# Patient Record
Sex: FEMALE | Race: WHITE | Employment: FULL TIME | URBAN - METROPOLITAN AREA
[De-identification: names, ages, dates, MRNs, and addresses within clinical notes are randomized per-mention and may not be internally consistent; named-entity substitution may affect disease eponyms.]

---

## 2018-01-23 ENCOUNTER — HOSPITAL ENCOUNTER (OUTPATIENT)
Dept: PHYSICAL THERAPY | Age: 38
Discharge: HOME | End: 2018-01-23
Payer: COMMERCIAL

## 2018-01-23 PROCEDURE — 97162 PT EVAL MOD COMPLEX 30 MIN: CPT

## 2018-01-23 PROCEDURE — 97110 THERAPEUTIC EXERCISES: CPT

## 2018-01-23 NOTE — PROGRESS NOTES
PHYSICAL THERAPY - DAILY TREATMENT NOTE     Patient Name: Jenny Veronica        Date: 2018  : 1980   YES Patient  Verified  Visit #:   1   of   8  Insurance: Payor: Dina Francis / Plan: [employer] HMO / Product Type: HMO /      In time: 3:00 Out time: 4:00   Total Treatment Time:  60 min     Medicare Time Tracking (below)   Total Timed Codes (min):  n/a 1:1 Treatment Time:  n/a     TREATMENT AREA =  Cervical radiculopathy, R shoulder pain    SUBJECTIVE    Pain Level (on 0 to 10 scale):  3  / 10   Medication Changes/New allergies or changes in medical history, any new surgeries or procedures? NO    If yes, update Summary List   Subjective Functional Status/Changes:  []  No changes reported     Patient's main complaint: R shoulder pain    When did symptoms begin and how: Patient reports onset of R shoulder pain approximately 18 months ago that radiated down the arm. Patient states she was diagnosed with cervical radiculopathy and provided oral steroids with abolishment of pain for 1 year. Pt reports current symptoms are \"similar\" in nature and began in 2017 while playing tennis. Patient reports pain has spread to the upper back and to the shoulder. Patient reports symptoms are on dorsum of the hand and into fingers 3-5 primarily. 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    Goals: Tennis        OBJECTIVE    Physical Therapy Evaluation - Shoulder    Physical Therapy Evaluation Cervical Spine - [facility]    SUBJECTIVE  Chief Complaint: RUE pain    Mechanism of injury: Insidious    Symptoms  Pain rating (0-10):    Today: 3/10   Best: 0/10   Worst: 3-4/10   [] Contstant:    [x] Intermittent:     Aggravated by: see above   [] Bending [] Sitting [] Standing [] Reaching Overhead   [] Moving [] Cough [] Sneeze [] Eating   [] AM  [] PM  Lying:  [] sup   [] pro   [] sidelying   [] Other:     Eased by: rest   [] Bending [] Sitting [] Standing Lying: [] sup  [] pro  [] sidelying   [] Moving [] AM  [] PM  [] Other:     General Health:  Red Flags Indicated? [] Yes    [x] No  [] Yes [x] No Recent weight change (If yes, due to dieting? [] Yes  [] No)   [] Yes [x] No Persistent cough  [] Yes [x] No Unremitting pain at night  [] Yes [x] No Dizziness  [] Yes [x] No Blurred vision  [] Yes [x] No Hands more cold or painful in cold weather  [] Yes [x] No Ringing in ears  [] Yes [x] No Difficulty swallowing  [] Yes [x] No Dysfunction of bowel or bladder  [] Yes [x] No Recent illness within past 3 weeks (i.e, cold, flu)  [] Yes [x] No Jaw pain    Past History/Treatments:    Diagnostic Tests: [] Lab work [] X-rays    [] CT [] MRI     [] Other:  Results: None    Headaches: Do you have headaches?  [] Yes   [x] No:    OBJECTIVE  Posture: [] WNL  Head Position: slightly forward  Shoulder/Scapular Position: slightly protracted    Cervical Retraction: [] WNL    [x] Abnormal:    Shoulder/Scapular Screen: [] WNL    [x] Abnormal:    Thoracic Spine: [] N/A    [] WNL   [x] Other:    Palpation:  [] Min  [x] Mod  [] Severe    Location: R upper trap, LS origin, medial scapular border    [] Min  [] Mod  [] Severe    Location:  [] Min  [] Mod  [] Severe    Location:    C/S ROM           Range        Effect            Strength (MMT)        R       L   Flex    62   Upper Trap (C2,3,4)     5   5   Ext    40 Centralized Shld IR (C5,6)     4+ 5     SB Right   28    Shld ER (C5)     4-   5   SB Left   30    Wrist flex (C7)     5   5   R-rot   75   stiffness Wrist ext (C6)     4 5   L-rot    67    Deltiod (C5,6)     4 p! 5            Biceps (C5, C6)     4   5            Triceps (C7)     4   5            Thenar Ext (C8)     5   5            Intrinsics (T1)     5   5         Low Trapezius                      R  strength: 53# average  L  strength: 64# average    Special Tests:  Cervical:        Vertebral Artery:  [] R    [] L    [] +    [x] -       Alar Ligament: [] R    [] L    [] +    [x] -       Transverse Lig: [] R    [] L    [] +    [x] -       Spurling's:  [x] R    [] L    [x] +    [] -    Visible Defects? [] Yes  [] No (ecchymosis, edema, inflammation, deformities)     ROM:  [] Unable to assess at this time     Painful Arc: [] Yes  [x] No                                            AROM                                                               Left Right   Flexion 160 147   Extension 70 60   Scaption/ 175   ER @ 0 Degrees 85 85   ER @ 90 Degrees 90 90   IR @ 90 Degrees     IR HBB T3 T10   Functional ER WNl WNL       Accessory Motion Testing  Laxity Present? [x] Yes   [] No   Direction of laxity: Posteriorly    Scapulohumoral Control / Rhythm:  Able to eccentrically lower with good control? Left: [x] Yes   [] No     Right: [x] Yes   [] No     Special Tests:     Impingement:   Neer's Test  [] Pos   [x] Neg   Hawkin's Test  [] Pos   [x] Neg  Cross body adduction [] Pos   [x] Neg    Biceps:   Speed's Test  [] Pos   [x] Neg  Dynamic Speeds Test  [] Pos   [x] Neg   Yergason's Test [] Pos   [x] Neg    RTC:    Empty Can  [] Pos   [x] Neg  Shrug Sign  [] Pos   [x] Neg  Drop Arm Test  [] Pos   [x] Neg    AC Joint:  AC Joint  [] Pos   [x] Neg  Shear Test  [] Pos   [x] Neg     Others:   SC Joint  [] Pos   [x] Neg  Pectoral Tightness [] Pos   [x] Neg  Anterior Apprehension [] Pos   [x] Neg   Posterior Apprehension [] Pos   [x] Neg    10 min Therapeutic Exercise:  [x]  See flow sheet   Rationale: To centralized C/S symptoms in order to reduce RUE dysfunction    throughout min Patient Education:  YES  Reviewed HEP   []  Progressed/Changed HEP based on:         Other Objective/Functional Measures:    See objective measures above     Post Treatment Pain Level (on 0 to 10) scale:   1  / 10 C/S     ASSESSMENT    Assessment/Changes in Function: Patient History: Moderate (hx cervical radiculopathy)  Examination (low 1-2, mod 3+, high 4+): High per objective above  Clinical Presentation (low stable or uncomplicated, mod evolving or changing, high unstable or unpredictable): Moderate  Clinical Decision Making (low , mod 26-74, high 1-25): FOTO Moderate     []  See Progress Note/Recertification   Patient will continue to benefit from skilled PT services to modify and progress therapeutic interventions, address functional mobility deficits, address ROM deficits, address strength deficits, analyze and address soft tissue restrictions, analyze and cue movement patterns, analyze and modify body mechanics/ergonomics, assess and modify postural abnormalities and instruct in home and community integration to attain remaining goals. to attain remaining goals.    Progress toward goals / Updated goals:    See POC     PLAN    [x]  Upgrade activities as tolerated YES Continue plan of care   []  Discharge due to :    []  Other:      Therapist: Mauro Ferreira DPT    Date: 1/23/2018 Time: 11:03 AM

## 2018-01-24 NOTE — PROGRESS NOTES
Richard [address] [facility] PHYSICAL THERAPY  [address] [facility] [address], [address] - Phone: [phone]  Fax: [phone] / [address] [facility]  Patient Name: Franchesca Laura : 1980   Medical   Diagnosis: Pain in right shoulder [M25.511] Treatment Diagnosis: C7-C8 Cervical Radiculopathy   Onset Date: 2017 AGE: 44 y.o. Referral Source: Referred, Self, MD Start of [facility]): 2018   Prior Hospitalization: See medical history Provider #: [id number]   Prior Level of Function: Recreationally active; avid    Comorbidities: Hx cervical radiculopathy '16   Medications: Verified on Patient Summary List   The Plan of Care and following information is based on the information from the initial evaluation.   =======================================================================================  Assessment / key information:  Patient is a 44 y.o. female who presents via [facility] with chief complaint of R arm pain, R shoulder pain, neck pain and R periscapular pain/stiffness. Evaluation revealed s/s consistent with C7-C8 posterior cervical derangement affecting RUE. Patient entered evaluation with c/o RUE pain/tingling/numbness into RUE down to dorsum of R hand. Pt able to centralize symptoms to R shoulder with repeated C/S extensions; pt further centralized to level of C/S with manual cervical retractions with end range extension. Pt demonstrates decreased cervical AROM, dec R shoulder strength/AROM (PROM WNL), + Spurlings, tenderness to palpation medial scapular border/cervical paraspinals/sub-occipitals and upper trap on the right and decreased  strength. Patient notes she has difficulty with work tasks, an inability to play tennis due to pain and overall impaired ability to perform ADLs and self care duties without pain.  Patient would benefit from skilled PT services to address these issues and improve the above mentioned impairments. Objective Data:    C/S ROM     Range    Effect            Strength (MMT)          R         L   Flex    62   Upper Trap (C2,3,4)     5   5   Ext    40 Centralized Shld IR (C5,6)     4+   5     SB Right   28    Shld ER (C5)     4-   5   SB Left   30    Wrist flex (C7)     5   5   R-rot   75   stiffness Wrist ext (C6)     4   5   L-rot    67    Deltiod (C5,6)     4 p!   5            Biceps (C5, C6)     4   5            Triceps (C7)     4   5            Thenar Ext (C8)     5   5            Intrinsics (T1)     5   5           Low Trapezius                         R  strength: 53# average  L  strength: 64# average    Shoulder ROM:  [] Unable to assess at this time      Painful Arc: [] Yes  [x] No                                            AROM                                                                Left Right   Flexion 160 147 p! Extension 70 60   Scaption/ 175   ER @ 0 Degrees 85 85   ER @ 90 Degrees 90 90   IR @ 90 Degrees       IR HBB T3 T10 p!    Functional ER WNl WNL      ======================================================================================  Eval Complexity: History: MEDIUM  Complexity : 1-2 comorbidities / personal factors will impact the outcome/ POC Exam:HIGH Complexity : 4+ Standardized tests and measures addressing body structure, function, activity limitation and / or participation in recreation  Presentation: MEDIUM Complexity : Evolving with changing characteristics  Clinical Decision Making:MEDIUM Complexity : FOTO score of 26-74Overall Complexity:MEDIUM  Problem List: pain affecting function, decrease ROM, decrease strength, decrease ADL/ functional abilitiies, decrease activity tolerance and decrease flexibility/ joint mobility   Treatment Plan may include any combination of the following: Therapeutic exercise, Therapeutic activities, Neuromuscular re-education, Physical agent/modality, Manual therapy, Patient education, Self Care training, Functional mobility training and Home safety training  Patient / Family readiness to learn indicated by: asking questions, trying to perform skills and interest  Persons(s) to be included in education: patient (P)  Barriers to Learning/Limitations: None  Measures taken:    Patient Goal (s): \"to be able to play tennis again. \"   Patient self reported health status: good  Rehabilitation Potential: good   Short Term Goals: To be accomplished in  2  Weeks:  1. Patient will be compliant with HEP in order to facilitate progression of therapeutic exercise and improve mobility  2. Patient will demonstrate ability to independently centralize RUE symptoms to level of C/S to dec RUE dysfunction     Long Term Goals: To be accomplished in  4  Weeks:  1. Patient will be independent with HEP in order to demonstrate ability to perform therapeutic exercises and continue progressing functional mobility upon D/C  2. Patient will demonstrate symmetrical bilateral  strength to improve ease with gripping/carrying  3. Patient will improve FOTO score by >/= 15%to demonstrate a meaningful improvement in functional mobility and increased quality of life  4. Patient will report 75% improvement in ability to write on whiteboard at work in order to improve ease with work tasks. 5. Patient will demonstrate R shoulder flexion/abduction >/= 165 degrees without pain/difficulty to improve ease with overhead activities      Frequency / Duration:   Patient to be seen  1-2  times per week for 4  weeks:  Patient / Caregiver education and instruction: self care, activity modification and exercises  G-Codes (GP): n/a  Therapist Signature: Fredo Fung DPT Date: 7/77/5918   Certification Period: n/a Time: 10:42 AM   ===========================================================================================  I certify that the above Physical Therapy Services are being furnished while the patient is under my care.   I agree with the treatment plan and certify that this therapy is necessary. Physician Signature:        Date:       Time:     Please sign and return to [employer] or you may fax the signed copy to [phone]. Thank you.

## 2018-02-02 ENCOUNTER — HOSPITAL ENCOUNTER (OUTPATIENT)
Dept: PHYSICAL THERAPY | Age: 38
Discharge: HOME | End: 2018-02-02
Payer: COMMERCIAL

## 2018-02-02 PROCEDURE — 97530 THERAPEUTIC ACTIVITIES: CPT

## 2018-02-02 PROCEDURE — 97110 THERAPEUTIC EXERCISES: CPT

## 2018-02-02 NOTE — PROGRESS NOTES
PHYSICAL THERAPY - DAILY TREATMENT NOTE    Patient Name: Arun Show        Date: 2018  : 1980   yes Patient  Verified  Visit #:   2   of   8  Insurance: Payor: May Merrill / Plan: [employer] HMO / Product Type: HMO /      In time: 980 Out time: 818   Total Treatment Time: 43     TREATMENT AREA =  Pain in right shoulder [M25.511]    SUBJECTIVE  Pain Level (on 0 to 10 scale):  3  / 10   Medication Changes/New allergies or changes in medical history, any new surgeries or procedures?    no  If yes, update Summary List   Subjective Functional Status/Changes:  []  No changes reported     \"I feel it down the R arm, the exercises help. I do need to sit up better. I will work on it\"          OBJECTIVE      35 min Therapeutic Exercise:  [x]  See flow sheet   Rationale:      increase ROM and increase strength to improve the patients ability to perform c/s AROM for ADLs/driving, to perform prolong sitting with ease        8 min Therapeutic Activity: postural/bed mobility training     Rationale:    increase ROM and increase strength to improve the patients ability to perform c/s AROM for ADLs/driving, to perform prolong sitting with ease        min Patient Education:  yes  Reviewed HEP   []  Progressed/Changed HEP based on:  Pt ed on importance and benefits of compliance with HEP, core strength/stability and proper posture; pt verbalized understanding       Other Objective/Functional Measures:  c/s ret- P/B> with ext- P/B =centralized to R ; instructed to perform every hr x 10  t/s ext over chair-P/B centralized to R     R T/s Rot < L T/s Rot with p!; decrease p! with rep R t/s Rot    VCs + demo to perform proper technique for TE    Initiated TE per flowsheet without c/o p!    Reviewed proper bed mobility, sleeping positions,  and importance and benefits of a neutral spine    c/o \"clicking\" without p! in R SH with tband ext   Post Treatment Pain Level (on 0 to 10) scale:   2  / 10 to Sh ASSESSMENT  Assessment/Changes in Function:     Progressed there-ex without c/o increase p!  demos poor posture; improved with VCs  demo proper bed mobility with VCs     []  See Progress Note/Recertification   Patient will continue to benefit from skilled PT services to modify and progress therapeutic interventions, address functional mobility deficits, address ROM deficits, address strength deficits, analyze and address soft tissue restrictions, analyze and cue movement patterns, analyze and modify body mechanics/ergonomics, assess and modify postural abnormalities and instruct in home and community integration to attain remaining goals.    Progress toward goals / Updated goals:    Pt's first visit since IE, no noted progress        PLAN  []  Upgrade activities as tolerated yes Continue plan of care   []  Discharge due to :    []  Other:      Therapist: Kat Greene PTA    Date: 2/2/2018 Time: 8:12 AM     Future Appointments  Date Time Provider Nimisha Saba   2/5/2018 7:30 AM [facility]   2/9/2018 7:30 AM [facility]   2/12/2018 7:30 AM [facility]   2/16/2018 7:30 AM Laureano Gold, PT [facility]   2/19/2018 7:30 AM Laureano Gold, PT [facility]   2/23/2018 7:30 AM [facility]   2/26/2018 7:30 AM [facility]

## 2018-02-05 ENCOUNTER — HOSPITAL ENCOUNTER (OUTPATIENT)
Dept: PHYSICAL THERAPY | Age: 38
Discharge: HOME | End: 2018-02-05
Payer: COMMERCIAL

## 2018-02-05 PROCEDURE — 97110 THERAPEUTIC EXERCISES: CPT

## 2018-02-05 PROCEDURE — 97140 MANUAL THERAPY 1/> REGIONS: CPT

## 2018-02-05 NOTE — PROGRESS NOTES
PHYSICAL THERAPY - DAILY TREATMENT NOTE      Patient Name: Jenny Veronica        Date: 2018  : 1980   YES Patient  Verified  Visit #:   3      8  Insurance: Payor: Dina Penny / Plan: [employer] HMO / Product Type: HMO /      In time: 7:33 Out time: 8:18   Total Treatment Time: 45       TREATMENT AREA =  Pain in right shoulder [M25.511]    SUBJECTIVE    Pain Level (on 0 to 10 scale):  3  / 10   Medication Changes/New allergies or changes in medical history, any new surgeries or procedures? NO    If yes, update Summary List   Subjective Functional Status/Changes:  []  No changes reported     \"Its down the arm a little bit. \" Patient reports the RUE pain is overall better. Pt reports she tried to swim on Saturday and the pain was pretty bad in the right arm       OBJECTIVE    37 min Therapeutic Exercise:  [x]  See flow sheet   Rationale:      increase ROM, increase strength, improve coordination, improve balance and increase proprioception to improve the patients ability to increase pt's stability/mobility and improve functional activity and ability to perform ADL's    8 min Manual Therapy: Manual C/S retractions with ext to end range   Rationale:      decrease pain, increase ROM, increase tissue extensibility and increase postural awareness to improve patient's ability to perform functional activities and decrease pain. 1 min Patient Education:  YES  Reviewed HEP   []  Progressed/Changed HEP based on: Other Objective/Functional Measures:    Pt entered session with complaint of RUE pain to lateral R shoulder.   C to C/S with repeated C/S extensions in seated position  Further C and reduced middle finger pain with manual C/S retr w/ ext to end range     Sig TTP R levator; introduced ischemic compression with tennis ball and theracane      Post Treatment Pain Level (on 0 to 10) scale:   1  / 10     ASSESSMENT    Assessment/Changes in Function:     Patient with overall improvement in radicular symptoms. Reports unable to tolerate \"light\" swimming d/t inc in R radicular symptoms. []  See Progress Note/Recertification   Patient will continue to benefit from skilled PT services to modify and progress therapeutic interventions, address functional mobility deficits, address ROM deficits, address strength deficits, analyze and address soft tissue restrictions, analyze and cue movement patterns, analyze and modify body mechanics/ergonomics, assess and modify postural abnormalities and instruct in home and community integration to attain remaining goals. Progress toward goals / Updated goals: · Short Term Goals: To be accomplished in  2  Weeks:  1. Patient will be compliant with HEP in order to facilitate progression of therapeutic exercise and improve mobility  2. Patient will demonstrate ability to independently centralize RUE symptoms to level of C/S to dec RUE dysfunction. Progressing       PLAN    [x]  Upgrade activities as tolerated YES Continue plan of care   []  Discharge due to :    []  Other:      Therapist: Vini Mitchell DPT, Cert.  DN    Date: 2/5/2018 Time: 7:24 AM     Future Appointments  Date Time Provider Nimisha Saba   [facility]   2/9/2018 7:30 AM [facility]   2/12/2018 7:30 AM Wilberto [facility]   2/16/2018 7:30 AM [facility]   2/19/2018 7:30 AM Tong [facility]oples [facility]   2/23/2018 7:30 AM [facility]   2/26/2018 7:30 AM Wilberto [facility]

## 2018-02-09 ENCOUNTER — HOSPITAL ENCOUNTER (OUTPATIENT)
Dept: PHYSICAL THERAPY | Age: 38
Discharge: HOME | End: 2018-02-09
Payer: COMMERCIAL

## 2018-02-09 PROCEDURE — 97110 THERAPEUTIC EXERCISES: CPT

## 2018-02-09 PROCEDURE — 97140 MANUAL THERAPY 1/> REGIONS: CPT

## 2018-02-09 NOTE — PROGRESS NOTES
PHYSICAL THERAPY - DAILY TREATMENT NOTE  -    Patient Name: Jacki Martinez        Date: 2018  : 1980   YES Patient  Verified  Visit #:   4   of   8  Insurance: Payor: [employer] Drivers / Plan: [employer] HMO / Product Type: HMO /      In time: 7:30 Out time: 8:20   Total Treatment Time: 50 min       TREATMENT AREA =  Pain in right shoulder [M25.511]    SUBJECTIVE    Pain Level (on 0 to 10 scale):  2-3  / 10   Medication Changes/New allergies or changes in medical history, any new surgeries or procedures? NO    If yes, update Summary List   Subjective Functional Status/Changes:  []  No changes reported     \"Its definitely better. It still comes down the arm every now and then. \"        OBJECTIVE    25 min Therapeutic Exercise:  [x]  See flow sheet   Rationale:      increase ROM, increase strength, improve coordination, improve balance and increase proprioception to improve the patients ability to increase pt's stability/mobility and improve functional activity and ability to perform ADL's    25 min Manual Therapy: Manual cervical retractions with ext to end range, STM to R ant/mid scalenes, STM to medial periscapular muscles, UT/LS   Rationale:      decrease pain, increase ROM, increase tissue extensibility, decrease trigger points and increase postural awareness to improve patient's ability to perform functional activities and decrease pain. 1 min Patient Education:  YES  Reviewed HEP   []  Progressed/Changed HEP based on:   Progressed scap stab #1 and #2 to GTB     Other Objective/Functional Measures:    Entered session with c/o slight dull achiness in RUE to dorsal 3rd and 4th digits. NE with repeated C/S retractions in sitting  Mild decr achiness with C/S retr w/ extension in sitting    Pt with significant tenderness along anterior/mid scalenes as the attach to first rib. Pt with reports of symptoms into pec major and anterior shoulder with STM to this area.     Added LT wall slides     Post Treatment Pain Level (on 0 to 10) scale:   0  / 10     ASSESSMENT    Assessment/Changes in Function:     Decreasing frequency/intensity of RUE pain symptoms. Continue to address scalene tightness and periscapular strengthening. []  See Progress Note/Recertification   Patient will continue to benefit from skilled PT services to modify and progress therapeutic interventions, address functional mobility deficits, address ROM deficits, address strength deficits, analyze and address soft tissue restrictions, analyze and cue movement patterns, analyze and modify body mechanics/ergonomics, assess and modify postural abnormalities, address imbalance/dizziness and instruct in home and community integration to attain remaining goals. Progress toward goals / Updated goals:    Progressing well towards goals. Added scalene STM and LT wall slides. PLAN    [x]  Upgrade activities as tolerated YES Continue plan of care   []  Discharge due to :    []  Other:      Therapist: Christen Alpers, DPT, Cert.  DN    Date: 2/9/2018 Time: 7:38 AM     Future Appointments  Date Time Provider Nimisha Saba   2/12/2018 7:30 AM [facility]le[facility]a [facility]   2/16/2018 7:30 AM Glorious Ace, PT [facility]   2/19/2018 7:30 AM Glorious Ace, PT [facility]   2/23/2018 7:30 AM Janinaa [facility]   2/26/2018 7:30 AM Delemanuela [facility]

## 2018-02-12 ENCOUNTER — HOSPITAL ENCOUNTER (OUTPATIENT)
Dept: PHYSICAL THERAPY | Age: 38
Discharge: HOME | End: 2018-02-12
Payer: COMMERCIAL

## 2018-02-12 PROCEDURE — 97140 MANUAL THERAPY 1/> REGIONS: CPT

## 2018-02-12 PROCEDURE — 97110 THERAPEUTIC EXERCISES: CPT

## 2018-02-12 NOTE — PROGRESS NOTES
PHYSICAL THERAPY - DAILY TREATMENT NOTE  8-    Patient Name: Arianna Galvez        Date: 2018  : 1980   YES Patient  Verified  Visit #:      of   8  Insurance: Payor: Toshia Corea / Plan: [employer] HMO / Product Type: HMO /      In time: 7:30 Out time: 8:15   Total Treatment Time: 45 min       TREATMENT AREA =  Pain in right shoulder [M25.511]    SUBJECTIVE    Pain Level (on 0 to 10 scale):  3   10   Medication Changes/New allergies or changes in medical history, any new surgeries or procedures? NO    If yes, update Summary List   Subjective Functional Status/Changes:  []  No changes reported     \"I didn't have the pain down my arm over the weekend. Its worse in the neck though. \"        OBJECTIVE    30 min Therapeutic Exercise:  [x]  See flow sheet   Rationale:      increase ROM, increase strength, improve coordination, improve balance and increase proprioception to improve the patients ability to increase pt's stability/mobility and improve functional activity and ability to perform ADL's    15 min Manual Therapy: STM to R ant/mid scalenes (combined with active shoulder flexion),    Rationale:      decrease pain, increase ROM, increase tissue extensibility, decrease trigger points and increase postural awareness to improve patient's ability to perform functional activities and decrease pain. 1 min Patient Education:  YES  Reviewed HEP   []  Progressed/Changed HEP based on: Other Objective/Functional Measures: Added UT/LS and rhomboid stretches seated  Added open book and scap stab #4    Continued significant TTP ant/mid scalenes. Post Treatment Pain Level (on 0 to 10) scale:   2   10     ASSESSMENT    Assessment/Changes in Function:     No symptoms distal to R shoulder x 3-4 days. Inc tightness/pain in periscapular and cervical musculature.      []  See Progress Note/Recertification   Patient will continue to benefit from skilled PT services to modify and progress therapeutic interventions, address functional mobility deficits, address ROM deficits, address strength deficits, analyze and address soft tissue restrictions, analyze and cue movement patterns, analyze and modify body mechanics/ergonomics, assess and modify postural abnormalities, address imbalance/dizziness and instruct in home and community integration to attain remaining goals. Progress toward goals / Updated goals: · Short Term Goals: To be accomplished in  2  Weeks:  1. Patient will be compliant with HEP in order to facilitate progression of therapeutic exercise and improve mobility  2. Patient will demonstrate ability to independently centralize RUE symptoms to level of C/S to dec RUE dysfunction        PLAN    [x]  Upgrade activities as tolerated YES Continue plan of care   []  Discharge due to :    []  Other:      Therapist: Wandy Rico, TIFFANIE, Cert.  DN    Date: 2/12/2018 Time: 7:26 AM     Future Appointments  Date Time Provider Nimisha Saba   2/12/2018 7:30 AM [facility]   2/16/2018 7:30 AM Aubree Tay PT [facility]   2/19/2018 7:30 AM Aubree Tay PT [facility]   2/23/2018 7:30 AM [facility]   2/26/2018 7:30 AM [facility]

## 2018-02-16 ENCOUNTER — HOSPITAL ENCOUNTER (OUTPATIENT)
Dept: PHYSICAL THERAPY | Age: 38
Discharge: HOME | End: 2018-02-16
Payer: COMMERCIAL

## 2018-02-16 PROCEDURE — 97110 THERAPEUTIC EXERCISES: CPT

## 2018-02-16 PROCEDURE — 97140 MANUAL THERAPY 1/> REGIONS: CPT

## 2018-02-16 NOTE — PROGRESS NOTES
PHYSICAL THERAPY - DAILY TREATMENT NOTE    Patient Name: Carli Heart        Date: 2018  : 1980   YES Patient  Verified  Visit #:   6   of   8  Insurance: Payor: Venu Carlisle / Plan: [employer] HMO / Product Type: HMO /      In time: 7:30 Out time: 8:20   Total Treatment Time: 50     TREATMENT AREA = Pain in right shoulder [M25.511]    SUBJECTIVE    Pain Level (on 0 to 10 scale):  3  / 10   Medication Changes/New allergies or changes in medical history, any new surgeries or procedures? NO    If yes, update Summary List   Subjective Functional Status/Changes:  []  No changes reported     \"It's mostly in the upper arm and top of the shoulder (UT) \"          OBJECTIVE  Therapeutic Procedures:  Min Procedure Specifics + Rationale   n/a [x]  Patient Education (performed throughout session) [x] Review HEP    [] Progressed/Changed HEP based on:   [] proper performance and advancement of Therex/TA   [] reduction in pain level    [] increased functional capacity       [] change in directional preference   25 [x] Therapeutic Exercise   [x]  See Flowsheet   Rationale: increase ROM and increase strength to improve the patients ability to participate in ADL's    25 []  Manual Therapy [] STM/DTM     [] IASTM     [] Scar Massage  [] X-friction       [] PNF         [] PROM    [] TDN (see objective; actual needle insertion time not billed)   [] Soft tissue mobz   [] Joint mobz: Gr I [] II []  III [] IV[] V[]:  Repeated retraction mobilization with extension and wiggle. Rationale: decrease pain, increase ROM, increase tissue extensibility, decrease trigger points and increase postural awareness to attain functional use and participation with ADL's         Other Objective/Functional Measures:  Repeated retraction with extension produced R middle finger symptoms, which patient reports had never occurred before. Required unloading to centralize, with C/S abolished but C pain to upper T/S.  Able to abolish with upper T/S PA mobs  Patient observed to turn and roll/rotate head when asked to reassess symptoms. She reported flare up of symptoms after doing so, and states that it is a habit of hers to do so throughout the day as it \"feels good sometimes\" but admits also worsens afterwards. When asked about the frequency of retractions, she admits she is not fully compliant, only performing when she remembers. Patient further educated on centralization process and maintaining HEP compliance frequency, as well as avoiding aggravating factors after centralizing. Patient declined manipulation  Performed Repeated Retractions throughout treatment b/w sets/reps/exercises as needed as prophylaxis and symptoms management. Post Treatment Pain Level (on 0 to 10) scale:   0  / 10     ASSESSMENT    Assessment/Changes in Function:     Derangement having difficulty reducing due to intermittent HEP compliance   []  See Progress Note/ Recertification  []  See Discharge Summary        Patient will continue to benefit from skilled PT services to modify and progress therapeutic interventions, address functional mobility deficits, address ROM deficits, address strength deficits, analyze and address soft tissue restrictions, analyze and cue movement patterns, analyze and modify body mechanics/ergonomics and instruct in home and community integration  to attain remaining goals   Progress toward goals / Updated goals:    Slow progression in carryover of relief     PLAN    [x]  Upgrade activities as tolerated  [x]  Update interventions per flow sheet YES Continue plan of care   []  Discharge due to :    []  Other:      Therapist: Dayton Vincent \"BJ\" Sheridan Slaughter, DPT, Cert. MDT, Cert. DN, Cert.  SMT    Date: 2/16/2018 Time: 7:32 AM   Future Appointments  Date Time Provider Nimisha Saba   2/19/2018 7:30 AM Robert Hercules [facility]   2/23/2018 7:30 AM [facility]   2/26/2018 7:30 AM [facility]

## 2018-02-19 ENCOUNTER — HOSPITAL ENCOUNTER (OUTPATIENT)
Dept: PHYSICAL THERAPY | Age: 38
Discharge: HOME | End: 2018-02-19
Payer: COMMERCIAL

## 2018-02-19 PROCEDURE — 97110 THERAPEUTIC EXERCISES: CPT

## 2018-02-19 PROCEDURE — 97140 MANUAL THERAPY 1/> REGIONS: CPT

## 2018-02-19 NOTE — PROGRESS NOTES
PHYSICAL THERAPY - DAILY TREATMENT NOTE    Patient Name: Kiko Price        Date: 2018  : 1980   YES Patient  Verified  Visit #:   7      8  Insurance: Payor: Adama Mulligan / Plan: [employer] HMO / Product Type: HMO /      In time: 7:30 Out time: 8:25   Total Treatment Time: 55     TREATMENT AREA = Pain in right shoulder [M25.511]    SUBJECTIVE    Pain Level (on 0 to 10 scale):  2  / 10   Medication Changes/New allergies or changes in medical history, any new surgeries or procedures? NO    If yes, update Summary List   Subjective Functional Status/Changes:  []  No changes reported     \"It's a lot better, just stiff. It's improving since I've been doing the exercises more. \"          OBJECTIVE  Therapeutic Procedures:  Min Procedure Specifics + Rationale   n/a [x]  Patient Education (performed throughout session) [x] Review HEP    [] Progressed/Changed HEP based on:   [] proper performance and advancement of Therex/TA   [] reduction in pain level    [] increased functional capacity       [] change in directional preference   40 [x] Therapeutic Exercise   [x]  See Flowsheet   Rationale: increase ROM and increase strength to improve the patients ability to participate in ADL's      Modality rationale: decrease inflammation, decrease pain, increase tissue extensibility and increase muscle contraction/control to improve the patients ability to perform ADL's with greater ease   Min Type Additional Details   15 [x]  Traction:   [] Cervical          []Lumbar                          [] Intermitent      []Continuous Step x1 Ramp/hold/ lbs:10/20     Angle:30degrees  [] supine              [] prone  [] legs elevated    [] legs flat   [] Skin assessment post-treatment:  []intact []redness- no adverse reaction       []redness  adverse reaction:     Other Objective/Functional Measures:    Resumed therex per flow sheet while performing repeated movements prn.   Educated pt on trial of mechanical traction and precautions. Despite discussing them and advising her to ring the bell and switch the buzzer, she did not utilize any signal until the end of the treatment even though she was experiencing pain/discomfort and headache. Patient advised to perform repeated retractions afterwards which abolished the headache. Post Treatment Pain Level (on 0 to 10) scale:   1-2  / 10     ASSESSMENT    Assessment/Changes in Function:     Derangement continuing to reduce with repeated movements, improved compliance to her HEP has improved her tolerance towards treatment. []  See Progress Note/ Recertification  []  See Discharge Summary        Patient will continue to benefit from skilled PT services to modify and progress therapeutic interventions, address functional mobility deficits, address ROM deficits, address strength deficits, analyze and address soft tissue restrictions, analyze and cue movement patterns, analyze and modify body mechanics/ergonomics and instruct in home and community integration  to attain remaining goals   Progress toward goals / Updated goals:    Improved HEP compliance     PLAN    [x]  Upgrade activities as tolerated  [x]  Update interventions per flow sheet YES Continue plan of care   []  Discharge due to :    []  Other:      Therapist: Juana Patel \"CRISTIAN\" Tang Jacobs DPT, Cert. MDT, Cert. DN, Cert.  SMT    Date: 2/19/2018 Time: 7:36 AM   Future Appointments  Date Time Provider Nimisha Saba   2/23/2018 7:30 AM [facility]   2/26/2018 7:30 AM [facility]

## 2018-02-23 ENCOUNTER — HOSPITAL ENCOUNTER (OUTPATIENT)
Dept: PHYSICAL THERAPY | Age: 38
Discharge: HOME | End: 2018-02-23
Payer: COMMERCIAL

## 2018-02-23 PROCEDURE — 97140 MANUAL THERAPY 1/> REGIONS: CPT

## 2018-02-23 PROCEDURE — 97110 THERAPEUTIC EXERCISES: CPT

## 2018-02-23 PROCEDURE — 97530 THERAPEUTIC ACTIVITIES: CPT

## 2018-02-23 NOTE — PROGRESS NOTES
PHYSICAL THERAPY - DAILY TREATMENT NOTE      Patient Name: Alexandru Zhu        Date: 2018  : 1980   YES Patient  Verified  Visit #:   8   of   16  Insurance: Payor: [facility] / Plan: [employer] HMO / Product Type: HMO /      In time: 7:30 Out time: 8:30   Total Treatment Time: 60 min       TREATMENT AREA =  Pain in right shoulder [M25.511]    SUBJECTIVE    Pain Level (on 0 to 10 scale):  1  / 10   Medication Changes/New allergies or changes in medical history, any new surgeries or procedures? NO    If yes, update Summary List   Subjective Functional Status/Changes:  []  No changes reported     \"Its about 70% better. \"        OBJECTIVE    25 min Therapeutic Exercise:  [x]  See flow sheet   Rationale:      increase ROM, increase strength, improve coordination, improve balance and increase proprioception to improve the patients ability to increase pt's stability/mobility and improve functional activity and ability to perform ADL's    25 min Therapeutic Activity: Re-assessment   Rationale: To assess current functional limitations and review POC    10 min Manual Therapy: Median nerve glides, STM to anterior/mid scalenes, Cervical repeated traction/retraction extension mobilization   Rationale:      decrease pain, increase ROM, increase tissue extensibility, decrease trigger points and increase postural awareness to improve patient's ability to perform functional activities and decrease pain. min Patient Education:  YES  Reviewed HEP   []  Progressed/Changed HEP based on:         Other Objective/Functional Measures:    See PN     Post Treatment Pain Level (on 0 to 10) scale:    10     ASSESSMENT    Assessment/Changes in Function:     See PN     []  See Progress Note/Recertification   Patient will continue to benefit from skilled PT services to modify and progress therapeutic interventions, address functional mobility deficits, address ROM deficits, address strength deficits, analyze and address soft tissue restrictions, analyze and cue movement patterns, analyze and modify body mechanics/ergonomics, assess and modify postural abnormalities, address imbalance/dizziness and instruct in home and community integration to attain remaining goals. Progress toward goals / Updated goals:    See PN     PLAN    [x]  Upgrade activities as tolerated YES Continue plan of care   []  Discharge due to :    []  Other:      Therapist: Jewel Davis DPT, Cert.  DN    Date: 2/23/2018 Time: 8:48 AM     Future Appointments  Date Time Provider Niimsha Saba   2/26/2018 7:30 AM [facility]   3/2/2018 7:30 AM [facility]   3/5/2018 7:30 AM Simona Olivier PT [facility]   3/20/2018 5:00 PM [facility]   3/28/2018 7:30 AM [facility]   3/30/2018 7:30 AM Simona Olivier PT [facility]

## 2018-02-23 NOTE — PROGRESS NOTES
[facility] [address] [facility] PHYSICAL THERAPY  [address] [place], [address] - Phone: [phone]  Fax: [phone]  PROGRESS NOTE  Patient Name: Marie Nguyen : 1980   Treatment/Medical Diagnosis: Pain in right shoulder [M25.511]   Referral Source: Dalila Dent MD     Date of Initial Visit: 18 Attended Visits: 8 Missed Visits: 0     SUMMARY OF TREATMENT  Patient's POC has consisted of active warm-up on UBE, cervical MDT, scapulothoracic and RTC strengthening, cervical stretching, UE nerve glides, postural education, thoracic mobility therex, manual therapy to address soft tissue and joint mobility restrictions, and instruction in comprehensive HEP. Key Functional Changes/Progress  Patient has demonstrated significant improvement since IE. Patient demonstrates ability to centralize RUE symptoms using MDT (cervical retractions w/ ext) with reported 70% improvement in overall symptoms since IE. Pt also demo improved R hand  strength, increased R shoulder AROM flexion and IR, gross improvement in RUE strength, slightly improved cervical AROM. She continues to require cueing for adherence to prescribed therex and posture throughout workday. Symptoms continue to be highly irritable although less intense. Symptoms include RUE neuropathic pain and cervical tightness. Pt demo + median and ulnar nerve tension tests today and significant turgor in R scalenes, upper trap, levator scap and cervical paraspinals/sub-occipitals.      Objective Data:     C/S ROM     Range    Effect            Strength (MMT)          R         L   Flex    51    Upper Trap (C2,3,4)     5   5   Ext   47 Centralized Shld IR (C5,6)     5   5     SB Right   32    Shld ER (C5)     5   5   SB Left   20  R sided tightness Wrist flex (C7)     5   5   R-rot   72   stiffness Wrist ext (C6)     5   5   L-rot   66    Deltiod (C5,6)     4+   5            Biceps (C5, C6)     4+   5            Triceps (C7)     5   5            Thenar Ext (C8)     5   5            Intrinsics (T1)     5   5                   R  strength: 53# average  L  strength: 64# average     Shoulder ROM:  [] Unable to assess at this time       Painful Arc: [] Yes  [x] No                                            AROM                                                                 Left Right   Flexion 150 151   Extension 70 67   IR HBB T6 T6        Goal/Measure of Progress Goal Met? 1. Patient will be independent with HEP in order to demonstrate ability to perform therapeutic exercises and continue progressing functional mobility upon D/C  2. Patient will demonstrate symmetrical bilateral  strength to improve ease with gripping/carrying  3. Patient will improve FOTO score by >/= 15%to demonstrate a meaningful improvement in functional mobility and increased quality of life  4. Patient will report 75% improvement in ability to write on whiteboard at work in order to improve ease with work tasks. 5. Patient will demonstrate R shoulder flexion/abduction >/= 165 degrees without pain/difficulty to improve ease with overhead activities   1. Partially compliant    2. MET    3. Not tested    4. Progressing    5. Progressing     New Goals to be achieved in __4__  weeks:  1. Patient will be independent with HEP in order to demonstrate ability to perform therapeutic exercises and continue progressing functional mobility upon D/C  2. Patient will tolerate full PT session without without RUE symptoms distal to shoulder in order to indicate improved tolerance for scapulothoracic strengthening and general activity   3.  Patient will demonstrate R shoulder flexion/abduction >/= 160 degrees without pain/difficulty to improve ease with overhead activities   Frequency / Duration:   Patient to be seen  2  times per week for 4  weeks:    G-Codes:    RECOMMENDATIONS  Continue and progress functional therex/therapeutic activity as able, utilizing manual therapy and modalities prn. Progress patient towards independent HEP to facilitate self-management of symptoms and progress gains after PT. If you have any questions/comments please contact us directly at [phone]. Thank you for allowing us to assist in the care of your patient. Therapist Signature: Vini Mitchell DPT, CertRemi NOVAK Date: 7/19/3231   Certification Period: n/a     Reporting Period 1/24/18 - 2/23/18   Time: 7:35 AM   NOTE TO PHYSICIAN:  PLEASE COMPLETE THE ORDERS BELOW AND FAX TO   [facility]: [phone] If you are unable to process this request in 24 hours please contact our office: [phone].    ___ I have read the above report and request that my patient continue as recommended.   ___ I have read the above report and request that my patient continue therapy with the following changes/special instructions:_________________________________________________________   ___ I have read the above report and request that my patient be discharged from therapy.      Physician Signature:        Date:       Time:

## 2018-02-26 ENCOUNTER — HOSPITAL ENCOUNTER (OUTPATIENT)
Dept: PHYSICAL THERAPY | Age: 38
Discharge: HOME | End: 2018-02-26
Payer: COMMERCIAL

## 2018-02-26 PROCEDURE — 97110 THERAPEUTIC EXERCISES: CPT

## 2018-02-26 NOTE — PROGRESS NOTES
PHYSICAL THERAPY - DAILY TREATMENT NOTE      Patient Name: Carli Heart        Date: 2018  : 1980   YES Patient  Verified  Visit #:     Insurance: Payor: Venu Carlisle / Plan: [employer] HMO / Product Type: HMO /      In time: 7:30 Out time: 8:10   Total Treatment Time: 40 min       TREATMENT AREA =  Pain in right shoulder [M25.511]    SUBJECTIVE    Pain Level (on 0 to 10 scale):  0  / 10   Medication Changes/New allergies or changes in medical history, any new surgeries or procedures? NO    If yes, update Summary List   Subjective Functional Status/Changes:  []  No changes reported     \"No pain, I felt pretty good over the weekend. My friends called and asked when I'm going to be able to play tennis. \"        OBJECTIVE    40 min Therapeutic Exercise:  [x]  See flow sheet   Rationale:      increase ROM, increase strength, improve coordination, improve balance and increase proprioception to improve the patients ability to increase pt's stability/mobility and improve functional activity and ability to perform ADL's    1 min Patient Education:  YES  Reviewed HEP   []  Progressed/Changed HEP based on:   Provided updated HEP     Other Objective/Functional Measures:    Pt presented without any pain/RUE symptoms today. Initiate UBE retro. Pt c/o inc RUE pain after approx 2 minutes  Scap stabs 1,2,4 performed with RTB  Bent over single arm dumb bell row progressed to 6#  Slight inc pain with R sidelying planks      Post Treatment Pain Level (on 0 to 10) scale:   0  / 10     ASSESSMENT    Assessment/Changes in Function:     Tolerated strengthening session with minimal increase in RUE symptoms. Fatigue following session.      []  See Progress Note/Recertification   Patient will continue to benefit from skilled PT services to modify and progress therapeutic interventions, address functional mobility deficits, address ROM deficits, address strength deficits, analyze and address soft tissue restrictions, analyze and cue movement patterns, analyze and modify body mechanics/ergonomics, assess and modify postural abnormalities, address imbalance/dizziness and instruct in home and community integration to attain remaining goals. Progress toward goals / Updated goals:    First visits since re-evaluation; no significant progress     PLAN    [x]  Upgrade activities as tolerated YES Continue plan of care   []  Discharge due to :    []  Other:      Therapist: Lesia Chun DPT, Cert.  DN    Date: 2/26/2018 Time: 7:40 AM     Future Appointments  Date Time Provider Nimisha Saba   3/2/2018 7:30 AM [facility]   3/5/2018 7:30 AM Christa Shrestha PT [facility]   3/20/2018 5:00 PM [facility]   3/28/2018 7:30 AM [facility]   3/30/2018 7:30 AM Christa Shrestha PT [facility]

## 2018-03-02 ENCOUNTER — HOSPITAL ENCOUNTER (OUTPATIENT)
Dept: PHYSICAL THERAPY | Age: 38
Discharge: HOME | End: 2018-03-02
Payer: COMMERCIAL

## 2018-03-02 PROCEDURE — 97110 THERAPEUTIC EXERCISES: CPT

## 2018-03-02 NOTE — PROGRESS NOTES
PHYSICAL THERAPY - DAILY TREATMENT NOTE      Patient Name: Alphonse Oquendo        Date: 3/2/2018  : 1980   YES Patient  Verified  Visit #:   10   of   16  Insurance: Payor: Elda Kristine / Plan: [employer] / Product Type: HMO /      In time: 7:30 Out time: 8:10   Total Treatment Time: 40 min       TREATMENT AREA =  Pain in right shoulder [M25.511]    SUBJECTIVE    Pain Level (on 0 to 10 scale):  1  / 10   Medication Changes/New allergies or changes in medical history, any new surgeries or procedures? NO    If yes, update Summary List   Subjective Functional Status/Changes:  []  No changes reported     \"I have had some aches in my arm mainly in my right shoulder (deltoid region), and I feel a little bit into the hand. I have been doing the stretches every day, but it has been really busy at work. \"       OBJECTIVE    40 min Therapeutic Exercise:  [x]  See flow sheet   Rationale:      increase ROM, increase strength, improve coordination and increase proprioception to improve the patients ability to increase pt's stability/mobility and improve functional activity and ability to perform ADL's     min Patient Education:  YES  Reviewed HEP   []  Progressed/Changed HEP based on:   Continued HEP     Other Objective/Functional Measures:    Intermittent VC's for postural awareness during standing TE activities. Pt benefits from intermittent VC's for UE mechanics during TE activity, and to cue for appropriate technique with wall median nerve glide. Pt able to reduce aching in the right hand with C/s retractions supine>seated. Right UE radicular symptoms resolve with passive C/s retraction and extension. Progressed prone row to 1/2 prone with chin tuck. Post Treatment Pain Level (on 0 to 10) scale:   \"0.5\"  / 10     ASSESSMENT    Assessment/Changes in Function:     Pt tolerated therapy session well today, but continues to have C/s radicular symptoms (ache) that reach the right hand. These symptoms resolve with passive C/s retraction and extension. Pt benefits from intermittent VC's to complete there-ex activity with accuracy. Ended session with repeated passive c/s retraction and extensions, with resolution of symptoms to \"barely noticeable. \"     []  See Progress Note/Recertification   Patient will continue to benefit from skilled PT services to modify and progress therapeutic interventions, address functional mobility deficits, address ROM deficits, address strength deficits, analyze and address soft tissue restrictions, analyze and cue movement patterns, analyze and modify body mechanics/ergonomics, assess and modify postural abnormalities, address imbalance/dizziness and instruct in home and community integration to attain remaining goals. Progress toward goals / Updated goals:    New Goals to be achieved in __4__  weeks:  1. Patient will be independent with HEP in order to demonstrate ability to perform therapeutic exercises and continue progressing functional mobility upon D/C  2. Patient will tolerate full PT session without without RUE symptoms distal to shoulder in order to indicate improved tolerance for scapulothoracic strengthening and general activity   3. Patient will demonstrate R shoulder flexion/abduction >/= 160 degrees without pain/difficulty to improve ease with overhead activities Addressed with LT wall slide for scapulohumeral mechanics.      PLAN    [x]  Upgrade activities as tolerated YES Continue plan of care   []  Discharge due to :    []  Other:      Therapist: Gretchen Kaminski DPT    Date: 3/2/2018 Time: 11:26 AM     Therapist: Jakob Hernandez SPT    Date: 3/2/2018 Time: 7:43 AM     Future Appointments  Date Time Provider Nimisha Saba   3/5/2018 7:30 AM Anette Rodarte PT [facility]   3/20/2018 5:00 PM [facility]   3/28/2018 7:30 AM [facility]   3/30/2018 7:30 AM Anette Rodarte PT [facility]

## 2018-03-05 ENCOUNTER — HOSPITAL ENCOUNTER (OUTPATIENT)
Dept: PHYSICAL THERAPY | Age: 38
Discharge: HOME | End: 2018-03-05
Payer: COMMERCIAL

## 2018-03-05 PROCEDURE — 97110 THERAPEUTIC EXERCISES: CPT

## 2018-03-05 NOTE — PROGRESS NOTES
PHYSICAL THERAPY - DAILY TREATMENT NOTE    Patient Name: Keith Dale        Date: 3/5/2018  : 1980   YES Patient  Verified  Visit #:     Insurance: Payor: Ned Ordoñez / Plan: [employer] HMO / Product Type: HMO /      In time: 7:30 Out time: 8:08   Total Treatment Time: 38     TREATMENT AREA = Pain in right shoulder [M25.511]    SUBJECTIVE    Pain Level (on 0 to 10 scale):  0-1  / 10   Medication Changes/New allergies or changes in medical history, any new surgeries or procedures? NO    If yes, update Summary List   Subjective Functional Status/Changes:  []  No changes reported     \"I'm leaving for about 10 days. A few in [place], then off to [employer].  \"          OBJECTIVE  Therapeutic Procedures:  Min Procedure Specifics + Rationale   n/a [x]  Patient Education (performed throughout session) [x] Review HEP    [] Progressed/Changed HEP based on:   [] proper performance and advancement of Therex/TA   [] reduction in pain level    [] increased functional capacity       [] change in directional preference   38 [x] Therapeutic Exercise   [x]  See Flowsheet   Rationale: increase ROM and increase strength to improve the patients ability to participate in ADL's      [] Skin assessment post-treatment:  []intact []redness- no adverse reaction       []redness  adverse reaction:     Other Objective/Functional Measures:    Discussed with patient travel plans and re-assessment upon return for possible DC if relatively asymptomatic     Post Treatment Pain Level (on 0 to 10) scale:   0  / 10     ASSESSMENT    Assessment/Changes in Function:     Derangement nearly fully reduced   []  See Progress Note/ Recertification  []  See Discharge Summary        Patient will continue to benefit from skilled PT services to modify and progress therapeutic interventions, address functional mobility deficits, address ROM deficits, address strength deficits, analyze and address soft tissue restrictions, analyze and cue movement patterns, analyze and modify body mechanics/ergonomics and radiculopathy  to attain remaining goals   Progress toward goals / Updated goals:    Progressing well in HEP independence     PLAN    [x]  Upgrade activities as tolerated  [x]  Update interventions per flow sheet YES Continue plan of care   []  Discharge due to :    []  Other:      Therapist: Chris Marking \"BJ\" TIFFANIE Lockhart, Cert. MDT, Cert. DN, Cert.  SMT    Date: 3/5/2018 Time: 7:59 AM   Future Appointments  Date Time Provider Nimisha Saba   3/20/2018 5:00 PM Fina Diaz [address] [place] [address]   3/28/2018 7:30 AM Fina Diaz [address] [place] [address]   3/30/2018 7:30 AM Jose Lagos PT [address] [place] [address]

## 2018-03-20 ENCOUNTER — HOSPITAL ENCOUNTER (OUTPATIENT)
Dept: PHYSICAL THERAPY | Age: 38
Discharge: HOME | End: 2018-03-20
Payer: COMMERCIAL

## 2018-03-20 PROCEDURE — 97110 THERAPEUTIC EXERCISES: CPT

## 2018-03-20 NOTE — PROGRESS NOTES
PHYSICAL THERAPY - DAILY TREATMENT NOTE      Patient Name: Emily Rowell        Date: 3/20/2018  : 1980   YES Patient  Verified  Visit #:     Insurance: Payor: Reji Banda / Plan: [employer] HMO / Product Type: HMO /       In time: 4:44 Out time: 5:32   Total Treatment Time: 48 min     Medicare Time Tracking (below)   Total Timed Codes (min):  n/a 1:1 Treatment Time:  n/a     TREATMENT AREA =  Pain in right shoulder [M25.511]    SUBJECTIVE    Pain Level (on 0 to 10 scale):  2  / 10   Medication Changes/New allergies or changes in medical history, any new surgeries or procedures? NO    If yes, update Summary List   Subjective Functional Status/Changes:  []  No changes reported     \"I still get some symptoms and soreness into the right arm. \"    Patient reports some days she does not have any symptoms. States she notices increase in symptoms later in the day. OBJECTIVE    48 min Therapeutic Exercise:  [x]  See flow sheet   Rationale:      increase ROM, increase strength, improve coordination, improve balance and increase proprioception to improve the patients ability to perform ADL's and functional mobility with increased ease and efficiency of movement      1 min Patient Education:  YES  Reviewed HEP   []  Progressed/Changed HEP based on:         Other Objective/Functional Measures:    C right UE symptoms with standing scapular retractions  Added new therex per flowsheet     Post Treatment Pain Level (on 0 to 10) scale:   0  / 10     ASSESSMENT    Assessment/Changes in Function:     Increased shoulder, RTC and scapulothoracic strengthening therex today without increased symptoms      []  See Progress Note/Recertification   Patient will continue to benefit from skilled PT services to modify and progress therapeutic interventions, address functional mobility deficits, address ROM deficits, address strength deficits, analyze and address soft tissue restrictions, analyze and cue movement patterns, analyze and modify body mechanics/ergonomics, assess and modify postural abnormalities, address imbalance/dizziness and instruct in home and community integration to attain remaining goals. Progress toward goals / Updated goals:    1. Patient will be independent with HEP in order to demonstrate ability to perform therapeutic exercises and continue progressing functional mobility upon D/C  2. Patient will tolerate full PT session without without RUE symptoms distal to shoulder in order to indicate improved tolerance for scapulothoracic strengthening and general activity. 3. Patient will demonstrate R shoulder flexion/abduction >/= 160 degrees without pain/difficulty to improve ease with overhead activities. incr shoulder strengthening therex including scaption with 3# dumb bell       PLAN    [x]  Upgrade activities as tolerated YES Continue plan of care   []  Discharge due to :    []  Other:      Therapist: Gatito Bowling DPT, Cert.  DN    Date: 3/20/2018 Time: 9:47 AM     Future Appointments  Date Time Provider Nimisha Saba   3/20/2018 5:00 PM [facility]   3/28/2018 7:30 AM [facility]

## 2018-03-28 ENCOUNTER — APPOINTMENT (OUTPATIENT)
Dept: PHYSICAL THERAPY | Age: 38
End: 2018-03-28
Payer: COMMERCIAL

## 2018-03-30 ENCOUNTER — APPOINTMENT (OUTPATIENT)
Dept: PHYSICAL THERAPY | Age: 38
End: 2018-03-30
Payer: COMMERCIAL

## 2018-04-04 ENCOUNTER — APPOINTMENT (OUTPATIENT)
Dept: PHYSICAL THERAPY | Age: 38
End: 2018-04-04
Payer: COMMERCIAL

## 2018-04-12 ENCOUNTER — HOSPITAL ENCOUNTER (OUTPATIENT)
Dept: PHYSICAL THERAPY | Age: 38
Discharge: HOME | End: 2018-04-12
Payer: COMMERCIAL

## 2018-04-12 PROCEDURE — 97530 THERAPEUTIC ACTIVITIES: CPT

## 2018-04-12 NOTE — PROGRESS NOTES
PHYSICAL THERAPY - DAILY TREATMENT NOTE      Patient Name: Sergio Lomas        Date: 2018  : 1980   YES Patient  Verified  Visit #:   15   of   16  Insurance: Payor: Marta Zhong / Plan: [employer] / Product Type: HMO /      In time: 10:30 Out time: 10:45   Total Treatment Time: 15 min       TREATMENT AREA =  Pain in right shoulder [M25.511]    SUBJECTIVE    Pain Level (on 0 to 10 scale):  0  / 10   Medication Changes/New allergies or changes in medical history, any new surgeries or procedures? NO    If yes, update Summary List   Subjective Functional Status/Changes:  []  No changes reported     \"I feel about 90-95% better. \"        OBJECTIVE    15 min Therapeutic Activity: FOTO, Re-evaluation   Rationale: To assess current functional limitations and review POC    1 min Patient Education:  YES  Reviewed HEP   []  Progressed/Changed HEP based on:         Other Objective/Functional Measures:    C/S ROM Range   Strength (MMT)    R    L   Flex   69    Upper Trap (C2,3,4)     5   5   Ext   57  Shld IR (C5,6)     5   5     SB Right   32    Shld ER (C5)     5   5   SB Left   30   Wrist flex (C7)     5   5   R-rot   68   Wrist ext (C6)     5   5   L-rot   66    Deltiod (C5,6)     4+   5            Biceps (C5, C6)     4+   5            Triceps (C7)     5   5            Thenar Ext (C8)     5   5            Intrinsics (T1)     5   5                   R  strength: 63# average  L  strength: 63# average    Shoulder ROM:  [] Unable to assess at this time       Painful Arc: [] Yes  [x] No                                            AROM                                                                 Left Right   Flexion 160 155   Extension 70 60   Scaption/ 175   ER @ 0 Degrees 85 85   ER @ 90 Degrees 90 90   IR @ 90 Degrees         IR HBB T3 T7   Functional ER WNL WNL       Post Treatment Pain Level (on 0 to 10) scale:   0  / 10     ASSESSMENT    Assessment/Changes in Function:     See D/C      []  See Progress Note/Recertification   Patient will continue to benefit from skilled PT services to modify and progress therapeutic interventions, address functional mobility deficits, address ROM deficits, address strength deficits, analyze and address soft tissue restrictions, analyze and cue movement patterns, analyze and modify body mechanics/ergonomics, assess and modify postural abnormalities, address imbalance/dizziness and instruct in home and community integration to attain remaining goals. Progress toward goals / Updated goals:    See D/C     PLAN    [x]  Upgrade activities as tolerated YES Continue plan of care   []  Discharge due to :    []  Other:      Therapist: Yokasta Benedict DPT, Cert.  DN    Date: 4/12/2018 Time: 9:39 AM     Future Appointments  Date Time Provider Nimisha Saba   4/12/2018 10:30 AM [facility]

## 2018-04-20 NOTE — PROGRESS NOTES
[address] [facility] PHYSICAL THERAPY  [address] [facility], [address] - Phone: [phone]  Fax: [phone]  DISCHARGE SUMMARY  Patient Name: Gurinder Song : 1980   Treatment/Medical Diagnosis: Pain in right shoulder [M25.511]   Referral Source: Aicha Sotomayor MD     Date of Initial Visit: 18 Attended Visits: 13 Missed Visits: 0     SUMMARY OF TREATMENT  Patient's POC has consisted of therex, therapeutic activities, manual therapy prn, modalities prn, pt. education, and a comprehensive HEP. Treatment strategies used to address functional mobility deficits, ROM deficits, strength deficits, analyze and address soft tissue restrictions, analyze and cue movement patterns, analyze and modify body mechanics/ergonomics, assess and modify postural abnormalities and instruct in home and community integration. CURRENT STATUS  Patient has demonstrated excellent progress since IE reporting symptom improvement of 90-95%. Pt demo improved cervical AROM, shoulder AROM and BUE strength with abolishment of RUE radicular symptoms.  She is independent with prescribed HEP and will be D/C from PT at this time.      C/S ROM Range   Strength (MMT)    R    L   Flex   69    Upper Trap (C2,3,4)     5   5   Ext   57   Shld IR (C5,6)     5   5     SB Right   32    Shld ER (C5)     5   5   SB Left   30    Wrist flex (C7)     5   5   R-rot   68    Wrist ext (C6)     5   5   L-rot   66    Deltiod (C5,6)     4+   5            Biceps (C5, C6)     4+   5            Triceps (C7)     5   5            Thenar Ext (C8)     5   5            Intrinsics (T1)     5   5                   R  strength: 63# average  L  strength: 63# average     Shoulder ROM:  [] Unable to assess at this time       Painful Arc: [] Yes  [x] No                                            AROM                                                                 Left Right   Flexion 160 155   Extension 70 60 Scaption/ 175   ER @ 0 Degrees 85 85   ER @ 90 Degrees 90 90   IR @ 90 Degrees         IR HBB T3 T7   Functional ER WNL WNL           Goal/Measure of Progress Goal Met?    1. Patient will be independent with HEP in order to demonstrate ability to perform therapeutic exercises and continue progressing functional mobility upon D/C  2. Patient will tolerate full PT session without without RUE symptoms distal to shoulder in order to indicate improved tolerance for scapulothoracic strengthening and general activity   3. Patient will demonstrate R shoulder flexion/abduction >/= 160 degrees without pain/difficulty to improve ease with overhead activities    1. MET    2. MET    3. Progressing (155 deg)          G-Codes: n/a    RECOMMENDATIONS  Discontinue therapy. Progressing towards or have reached established goals. If you have any questions/comments please contact us directly at [phone]. Thank you for allowing us to assist in the care of your patient. Therapist Signature: Landon Kim DPT, [place] NOVAK Date: 4/20/18   Reporting Period: n/a Time: 12:37 PM     ===========================================================================================  I certify that the above Physical Therapy Services are being furnished while the patient is under my care.     ___ I have read the above report and request that my patient be discharged from therapy. Physician Signature:        Date:       Time:     Please sign and return to [employer] or you may fax the signed copy to [phone]. Thank you.